# Patient Record
Sex: MALE | Race: BLACK OR AFRICAN AMERICAN | Employment: UNEMPLOYED | URBAN - METROPOLITAN AREA
[De-identification: names, ages, dates, MRNs, and addresses within clinical notes are randomized per-mention and may not be internally consistent; named-entity substitution may affect disease eponyms.]

---

## 2019-05-13 PROBLEM — M54.9 BILATERAL BACK PAIN: Status: ACTIVE | Noted: 2019-05-13

## 2019-05-13 PROBLEM — I10 ESSENTIAL HYPERTENSION: Status: ACTIVE | Noted: 2019-05-13

## 2019-05-13 PROBLEM — E78.5 DYSLIPIDEMIA: Status: ACTIVE | Noted: 2019-05-13

## 2019-05-13 PROBLEM — E11.9 TYPE 2 DIABETES MELLITUS WITHOUT COMPLICATION, WITHOUT LONG-TERM CURRENT USE OF INSULIN (HCC): Status: ACTIVE | Noted: 2019-05-13

## 2020-02-07 PROBLEM — G56.03 CARPAL TUNNEL SYNDROME ON BOTH SIDES: Status: ACTIVE | Noted: 2020-02-07

## 2020-02-07 PROBLEM — G89.21 CHRONIC PAIN DUE TO TRAUMA: Status: ACTIVE | Noted: 2020-02-07

## 2020-05-08 PROBLEM — R74.8 ELEVATED CK: Status: ACTIVE | Noted: 2020-05-08

## 2021-06-09 PROBLEM — E55.9 VITAMIN D DEFICIENCY: Status: ACTIVE | Noted: 2021-06-09

## 2021-11-12 PROBLEM — F52.21 ERECTILE DISORDER, GENERALIZED, MILD: Status: ACTIVE | Noted: 2021-11-12

## 2022-03-18 PROBLEM — F52.21 ERECTILE DISORDER, GENERALIZED, MILD: Status: ACTIVE | Noted: 2021-11-12

## 2022-03-18 PROBLEM — R74.8 ELEVATED CK: Status: ACTIVE | Noted: 2020-05-08

## 2022-03-18 PROBLEM — E55.9 VITAMIN D DEFICIENCY: Status: ACTIVE | Noted: 2021-06-09

## 2022-03-19 PROBLEM — G56.03 CARPAL TUNNEL SYNDROME ON BOTH SIDES: Status: ACTIVE | Noted: 2020-02-07

## 2022-03-19 PROBLEM — E11.9 TYPE 2 DIABETES MELLITUS WITHOUT COMPLICATION, WITHOUT LONG-TERM CURRENT USE OF INSULIN (HCC): Status: ACTIVE | Noted: 2019-05-13

## 2022-03-19 PROBLEM — I10 ESSENTIAL HYPERTENSION: Status: ACTIVE | Noted: 2019-05-13

## 2022-03-19 PROBLEM — M54.9 BILATERAL BACK PAIN: Status: ACTIVE | Noted: 2019-05-13

## 2022-03-19 PROBLEM — E78.5 DYSLIPIDEMIA: Status: ACTIVE | Noted: 2019-05-13

## 2022-03-19 PROBLEM — G89.21 CHRONIC PAIN DUE TO TRAUMA: Status: ACTIVE | Noted: 2020-02-07

## 2022-04-14 ENCOUNTER — HOSPITAL ENCOUNTER (OUTPATIENT)
Dept: GENERAL RADIOLOGY | Age: 57
Discharge: HOME | End: 2022-04-14
Payer: COMMERCIAL

## 2022-04-14 DIAGNOSIS — G89.29 CHRONIC LEFT SHOULDER PAIN: ICD-10-CM

## 2022-04-14 DIAGNOSIS — M25.512 CHRONIC LEFT SHOULDER PAIN: ICD-10-CM

## 2022-04-14 PROCEDURE — 73030 X-RAY EXAM OF SHOULDER: CPT

## 2022-05-31 ENCOUNTER — TELEPHONE (OUTPATIENT)
Dept: FAMILY MEDICINE CLINIC | Facility: CLINIC | Age: 57
End: 2022-05-31

## 2022-05-31 NOTE — TELEPHONE ENCOUNTER
[facility] Nurse- Peer to Peer     Please call back [phone]    Note:appointment  Tomorrow

## 2022-06-06 ENCOUNTER — TELEPHONE (OUTPATIENT)
Dept: FAMILY MEDICINE CLINIC | Facility: CLINIC | Age: 57
End: 2022-06-06

## 2022-06-06 NOTE — TELEPHONE ENCOUNTER
Nurse care manage with first choice -    MRI denied 05/31Reason:    [facility] - Not medical Necessarily     Notes to prove why is it necessarily- labs / ov  Prior to request     Fax# [phone]    Tracking #[id number]     Call ref # [id number]      First choice RN Laura Hassan) [phone]

## 2022-06-06 NOTE — TELEPHONE ENCOUNTER
Per [facility] ([facility] ) MRI was order by Pain management Not NP. Mara Thomas  Left a Voicemail for [place](  RN)

## 2022-06-17 ENCOUNTER — TELEPHONE (OUTPATIENT)
Dept: ENDOCRINOLOGY | Age: 57
End: 2022-06-17

## 2022-06-17 DIAGNOSIS — E11.65 UNCONTROLLED TYPE 2 DIABETES MELLITUS WITH HYPERGLYCEMIA (HCC): Primary | ICD-10-CM

## 2022-06-17 NOTE — TELEPHONE ENCOUNTER
Called patient to let him know about lab orders. Patient asked about medication regimen. Patient states that he is taking his Alogliptin 25 mg in the morning with his Metformin 750 mg. Patient verbalized that he is checking blood sugars before each meal and giving insulin per scale. Patient states numbers have been between 100-136 at this time. Patient also reminded to bring meter to appointment.

## 2022-06-17 NOTE — TELEPHONE ENCOUNTER
Patient's pharmacy called and stated that patient had a medication error. Patient was given Meloxicam 7.5 MG by mistake instead of his Metformin 750mg. Patient took this medication for about three weeks before realizing there was an issue. Juan Carlos Pedraza stated that patient came to them concerned because his sugar were not being controlled. Patient looked at his prescription bottle and realized that he had been taking the wrong mediation. Pharmacist stated that they made sure patient had the correct medication. Called patient to see how he's been feeling. Patient stated that he was feeling better at this time. Patient did state that he had some issues with mental clarity, mostly due to blood sugars being elevated. Patient advised that provider wants to do lab work to check his kidney function. Patient verbalized understanding and will get this done when he's able to obtain transportation.

## 2022-06-17 NOTE — TELEPHONE ENCOUNTER
BMP for \"other\" lab ordered. Please arrange to send lab req to patient or to lab. Please remind patient that with his A1c > 10, the metfomrin alone will not be enough to improve his glucose. It is critical that he check his glucose and use the insulin that is prescribed. Please ensure he is taking and tolerating the nesina that was RX'd. Please ensure he knows that it is critical that he checks his sugar before breakfast/lunch/supper/bed and takes the correction scale Admelog, then rehecks the sugar to verify that his scale is right for him. He should have his meter at our next visit to download and discuss.

## 2022-06-21 ENCOUNTER — TELEPHONE (OUTPATIENT)
Dept: FAMILY MEDICINE CLINIC | Facility: CLINIC | Age: 57
End: 2022-06-21

## 2022-06-21 DIAGNOSIS — M25.512 CHRONIC LEFT SHOULDER PAIN: Primary | ICD-10-CM

## 2022-06-21 DIAGNOSIS — G89.29 CHRONIC LEFT SHOULDER PAIN: Primary | ICD-10-CM

## 2022-06-22 NOTE — TELEPHONE ENCOUNTER
Called pt and he is stating that he hasn't seen Pain Mgmt yet for back due to insurance not covering visit, but wanted to know if we can resend the referral to the same pain mgmt in [place] for his shoulder pain.  Ty

## 2022-07-14 DIAGNOSIS — E11.65 UNCONTROLLED TYPE 2 DIABETES MELLITUS WITH HYPERGLYCEMIA (HCC): ICD-10-CM

## 2022-07-14 LAB
ANION GAP SERPL CALC-SCNC: 9 MMOL/L (ref 7–16)
BUN SERPL-MCNC: 15 MG/DL (ref 6–23)
CALCIUM SERPL-MCNC: 9.7 MG/DL (ref 8.3–10.4)
CHLORIDE SERPL-SCNC: 110 MMOL/L (ref 98–107)
CO2 SERPL-SCNC: 21 MMOL/L (ref 21–32)
CREAT SERPL-MCNC: 1.1 MG/DL (ref 0.8–1.5)
GLUCOSE SERPL-MCNC: 127 MG/DL (ref 65–100)
POTASSIUM SERPL-SCNC: 4.6 MMOL/L (ref 3.5–5.1)
SODIUM SERPL-SCNC: 140 MMOL/L (ref 136–145)

## 2022-09-21 DIAGNOSIS — E55.9 VITAMIN D DEFICIENCY, UNSPECIFIED: ICD-10-CM

## 2022-09-22 RX ORDER — ERGOCALCIFEROL 1.25 MG/1
CAPSULE ORAL
Qty: 4 CAPSULE | Refills: 8 | Status: SHIPPED | OUTPATIENT
Start: 2022-09-22

## 2022-10-06 ENCOUNTER — OFFICE VISIT (OUTPATIENT)
Dept: ENDOCRINOLOGY | Age: 57
End: 2022-10-06
Payer: MEDICAID

## 2022-10-06 VITALS
BODY MASS INDEX: 26.47 KG/M2 | HEART RATE: 83 BPM | SYSTOLIC BLOOD PRESSURE: 140 MMHG | DIASTOLIC BLOOD PRESSURE: 80 MMHG | OXYGEN SATURATION: 97 % | WEIGHT: 169 LBS

## 2022-10-06 DIAGNOSIS — E78.5 DYSLIPIDEMIA: ICD-10-CM

## 2022-10-06 DIAGNOSIS — E11.65 UNCONTROLLED TYPE 2 DIABETES MELLITUS WITH HYPERGLYCEMIA (HCC): Primary | ICD-10-CM

## 2022-10-06 DIAGNOSIS — B35.3 TINEA PEDIS OF BOTH FEET: ICD-10-CM

## 2022-10-06 DIAGNOSIS — I10 ESSENTIAL HYPERTENSION: ICD-10-CM

## 2022-10-06 LAB — HBA1C MFR BLD: 7.2 %

## 2022-10-06 PROCEDURE — 99214 OFFICE O/P EST MOD 30 MIN: CPT | Performed by: PHYSICIAN ASSISTANT

## 2022-10-06 PROCEDURE — 83036 HEMOGLOBIN GLYCOSYLATED A1C: CPT | Performed by: PHYSICIAN ASSISTANT

## 2022-10-06 PROCEDURE — 3046F HEMOGLOBIN A1C LEVEL >9.0%: CPT | Performed by: PHYSICIAN ASSISTANT

## 2022-10-06 RX ORDER — ALOGLIPTIN 25 MG/1
25 TABLET, FILM COATED ORAL DAILY
Qty: 90 TABLET | Refills: 3 | Status: SHIPPED | OUTPATIENT
Start: 2022-10-06

## 2022-10-06 RX ORDER — BLOOD SUGAR DIAGNOSTIC
STRIP MISCELLANEOUS
Qty: 150 EACH | Refills: 11 | Status: SHIPPED | OUTPATIENT
Start: 2022-10-06

## 2022-10-06 RX ORDER — GABAPENTIN 100 MG/1
CAPSULE ORAL 3 TIMES DAILY
COMMUNITY

## 2022-10-06 RX ORDER — LISINOPRIL 20 MG/1
20 TABLET ORAL DAILY
Qty: 90 TABLET | Refills: 3 | Status: SHIPPED | OUTPATIENT
Start: 2022-10-06

## 2022-10-06 RX ORDER — CLOTRIMAZOLE 1 %
CREAM (GRAM) TOPICAL
Qty: 28 G | Refills: 1 | Status: SHIPPED | OUTPATIENT
Start: 2022-10-06 | End: 2022-10-13

## 2022-10-06 RX ORDER — INSULIN LISPRO 100 U/ML
INJECTION, SOLUTION SUBCUTANEOUS 3 TIMES DAILY
COMMUNITY

## 2022-10-06 NOTE — PROGRESS NOTES
[facility]    Jackie Garcia PA-C  [facility]  [address], [address]  [place], [place]  Phone [phone]  Facsimile [phone]          Sofya Bishop is a 55 y.o. male seen 10/6/2022 for follow up evaluation of type 2 diabetes         Assessment and Plan:    In office COVID-19 PPE worn and precautions taken    1. Uncontrolled type 2 diabetes mellitus with hyperglycemia ([facility] [address]  Patient with type 2 diabetes exhibiting suboptimal but much improved glycemic control on DPP-4, metformin, and correction scale insulin. He reports he is monitoring his blood sugar more often and altering or eliminating hyperglycemic triggers. I certainly believe he would benefit from diabetes education however transportation is an issue. He might be open to attending a program in [address] improved lifestyle, more regular monitoring and good mediation compliance       - AMB POC HEMOGLOBIN A1C  - alogliptin (NESINA) 25 MG TABS tablet; Take 1 tablet by mouth daily  Dispense: 90 tablet; Refill: 3  - lisinopril (PRINIVIL;ZESTRIL) 20 MG tablet; Take 1 tablet by mouth daily  Dispense: 90 tablet; Refill: 3  - ACCU-CHEK GUIDE strip; Use to check glucose 4 times daily E11.65  Dispense: 150 each; Refill: 11  - Comprehensive Metabolic Panel; Future  - CBC with Auto Differential; Future  - Microalbumin / Creatinine Urine Ratio; Future  - Lipid Panel; Future  - Hemoglobin A1C; Future  - TSH with Reflex; Future  - Vitamin D 25 Hydroxy; Future  - External Referral to Ophthalmology  - clotrimazole (LOTRIMIN AF) 1 % cream; Apply topically 2 times daily. Dispense: 28 g; Refill: 1    2. Essential hypertension  BP Readings from Last 3 Encounters:   10/06/22 (!) 140/80   05/06/22 (!) 140/92   03/30/22 125/80       - Microalbumin / Creatinine Urine Ratio; Future    3.  Dyslipidemia  Repeat fasting labs prior to next visit  - Lipid Panel; Future    4. Tinea pedis of both feet  RX lotrimin, discussed home foot care          Demetrius Carson was seen today for diabetes. Diagnoses and all orders for this visit:    Uncontrolled type 2 diabetes mellitus with hyperglycemia ([facility] [address]  -     AMB POC HEMOGLOBIN A1C  -     alogliptin (NESINA) 25 MG TABS tablet; Take 1 tablet by mouth daily  -     lisinopril (PRINIVIL;ZESTRIL) 20 MG tablet; Take 1 tablet by mouth daily  -     ACCU-CHEK GUIDE strip; Use to check glucose 4 times daily E11.65  -     Comprehensive Metabolic Panel; Future  -     CBC with Auto Differential; Future  -     Microalbumin / Creatinine Urine Ratio; Future  -     Lipid Panel; Future  -     Hemoglobin A1C; Future  -     TSH with Reflex; Future  -     Vitamin D 25 Hydroxy; Future  -     External Referral to Ophthalmology  -     clotrimazole (LOTRIMIN AF) 1 % cream; Apply topically 2 times daily. Essential hypertension  -     Microalbumin / Creatinine Urine Ratio; Future    Dyslipidemia  -     Lipid Panel; Future    Tinea pedis of both feet          History of Present Illness:    10/6/2022    Pt RTC. Doing well. Taking aloglipitin and metformin without SE. Using correction AC/HS as needed  Wife now going to dialysis center so pt has less stress as her caregiver allowing more time for self care. Is monitoring glucose more regularly and limiting hyperglycemic triggers        Current Regimen: metformin ER 750mg BID, alogliptin 25mg , admelog by correction 1/50>150       5/6/2022   Patient returns clinic for follow-up of worsening and uncontrolled type 2 diabetes. Although he remains compliant with metformin he has tolerated the alogliptin trial but has been out for greater than 1 week. He continues to have access to Admelog but  he is rarely checking his blood sugar, rarely using the Admelog and never rechecking his glucose to see if his current correction scale of 1 per 50 greater than 150 is impactful.   Patient continues to spend most of each visit discussing his pain and stress. His difficulty focusing on his reason for being seen by this office constantly discussing other medical problems. Patient did not bring blood glucometer to the office visit today. 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 Has suffered from several recent family deaths            6/9/2021   VIRTUAL VISIT   Connor Hardy is a 48 y.o. male who was seen by synchronous (real-time) audio-video technology on 6/9/2021 . The patient provided consent for this audio-video interaction. The EverCloud platform was used. Patient and provider were  located at their individual homes. Pt stopped taking basaglar due to stable blood sugars several months ago, not using admelog as prescribed. Pt reports significant stress with car issues and family. Admits to not checking blood sugars. Was treated in ER for hyperglycemia               DIABETES MELLITUS   Connor Hardy is here for  follow up evaluation and treatment of improving type 2 diabetes mellitus. Review of Records: pt with poor glycemic control referred for management            Date of diagnosis: ~2015   Found by wife with sleepiness, called rescue        Started on insulin in hospital    Discharged on metformin   Added long acting around [id number]       Pt has 2 brothers with type 1 diabetes       Denies  HX pancreatitis, DKA, gastroparesis        05/12/2020   Connor Hardy is a 49 y.o. male who was seen by synchronous (real-time) audio-video technology on 05/12/2020 . The patient provided consent for this audio-video interaction. The EverCloud platform was used. 7/7/2020   VIRTUAL VISIT   Connor Hardy is a 49 y.o. male who was seen by synchronous (real-time) audio-video technology on 7/7/2020 .   The patient provided consent for this audio-video interaction. The [employer] platform was used. Patient and provider were located  at their individual homes. Pt meets for virtual follow up diabetes. Stopped insulin approx 1 month ago. States his sugars were normal and did not need insulin. Is rarely checking blood glucose. 9/9/2020   VIRTUAL VISIT   Obdulio Carcamo is a 49 y.o. male who was seen by synchronous (real-time) audio-video technology on 9/9/2020 . The patient provided consent for this audio-video interaction. The [employer] platform was used. Patient and provider were located  at their individual homes. 12/9/2020   VIRTUAL VISIT   Obdulio Carcamo is a 49 y.o. male who was seen by synchronous (real-time) audio-video technology on 12/9/2020 . The patient provided consent for this audio-video interaction. The [employer] platform was used. Patient was located at  their home and provider in the office. History obtained from patient       Diet: eats home, varied, avoid fried food, drink water, some milk in cereal, beer occasionally, some liquor with juice       Exercise:  High stress as caregiver for wife, chronic pain limits activity        Notes increase in blood glucose with stress           Body weight trend: decreasing steadily, over past few years           Wt Readings from Last 3 Encounters:        05/06/22  168 lb (76.2 kg)     03/30/22  168 lb 9.6 oz (76.5 kg)        02/23/22  164 lb (74.4 kg)                                            Wt Readings from Last 3 Encounters:        02/07/20  170 lb 12.8 oz (77.5 kg)     02/06/20  173 lb (78.5 kg)     11/19/19  173 lb (78.5 kg)                 Diabetes education: The patient has received formal diabetes education. ~2018       Diabetic complications:                    Retinopathy: Last eye exam was 5/2020 and demonstrated no diabetic retinopathy. [facility]  care specialist is [facility].                    Albuminuria/nephropathy: 05/13/2019      Cr 1.05, GFR 84, microalbumin/Cr ratio 19.3                           09/13/2019      Cr 1.03, GFR 96                           02/06/2020      Cr 1.07, GFR 91, microalbumin/Cr ratio 5                           05/26/2020      Cr 1.16, GFR 83                               05/07/2021      Cr 1.37, GFR 67                                   08/11/2021      Cr 1.01, GFR 97                                   01/25/2022      Cr 1.14, GFR 72                                  03/30/2022      Cr 1.03, GFR 86, microalbumin/Cr ratio <30     07/14/2022 Cr 1.10, GFR >60                                                   Neuropathy:  numbness and stiffness, swelling, sharp constant bilateral hands and LE  to thigh and to elbow            Home blood glucose monitoring frequency: 0.3 times per day    By review of meter download over past 30 days  Average blood glucose 169 ± 28  Range 143-237     Typical Standard Deviation   Fasting 160 15   AC lunch 176 35   AC supper 160 0   Bedtime - -     Blood glucose levels are MUCH improved!         Hypoglycemia: rare, hypoglycemic aware <80, as low as 57 Symptoms include can't move       Hyperglycemia: Frequent, >200 symptoms headaches       Hemoglobin A1c:               02/12/2019      13.6%               04/18/2019      7.9%               09/13/2019      7.9%               02/06/2020      9.3%               07/14/2020      7.4%                   12/28/2020      8.1%                   05/07/2021      12.5%                       08/11/2021      7.2%                       11/12/2021      8.2%                      03/30/2022      10.1%    10/06/2022 7.2%                                      Other pertinent labs:                   Diabetes labs:                           02/06/2020                                       C-peptide        1.1 (fasting and concomitant )                                       IA-2 Ab            <7.5                                       ZNT8 Ab <1.5                                       AMANDA-65           <5.0                               Lipids:                            09/13/2019  TC- 177, LDL- 96, VLDL- 31,  HDL- 50, TG- 157                           02/06/2020  TC- 153, LDL- 77, VLDL- 29,  HDL- 47, TG- 145                           05/26/2020  TC- 235, LDL- 154, VLDL- 27,  HDL- 54, TG- 134                                      03/30/2022  TC- 194, LDL- 106, VLDL- 32,  HDL- 56, TG- 186             Allergies & Medications:  Reviewed in chart. Review of Systems   Musculoskeletal:  Positive for arthralgias. Skin:         foot pain, worse at plantar MTP     Vital Signs:  BP (!) 140/80 (Site: Left Upper Arm, Position: Sitting)   Pulse 83   Wt 169 lb (76.7 kg)   SpO2 97%   BMI 26.47 kg/m²       Physical Exam  Constitutional:       Appearance: Normal appearance. Neck:      Thyroid: No thyromegaly. Cardiovascular:      Rate and Rhythm: Normal rate and regular rhythm. Pulmonary:      Effort: Pulmonary effort is normal.      Breath sounds: Normal breath sounds. Abdominal:      Palpations: Abdomen is soft. Feet:      Right foot:      Protective Sensation: 3 sites tested. 3 sites sensed. Left foot:      Protective Sensation: 3 sites tested. 3 sites sensed. Comments: Macerated and cracked skin of plantar MTP bilaterally, R>L  Lymphadenopathy:      Cervical: No cervical adenopathy. Skin:     General: Skin is warm and dry. Neurological:      General: No focal deficit present. Mental Status: He is alert. Psychiatric:         Mood and Affect: Mood normal.         Behavior: Behavior normal.         Thought Content: Thought content normal.         Judgment: Judgment normal.           Return 3-4 mo, for Diabetes DM2 Follow-Up. Portions of this note were generated with the assistance of voice recogniton software. As such, some errors in transcription may be present.

## 2022-10-06 NOTE — PATIENT INSTRUCTIONS
Patient Education        Diabetes Foot Health: Care Instructions  Your Care Instructions     When you have diabetes, your feet need extra care and attention. Diabetes can damage the nerve endings and blood vessels in your feet, making you less likely to notice when your feet are injured. Diabetes also limits your body's ability to fight infection and get blood to areas that need it. If you get a minor foot injury, it could become an ulcer or a serious infection. With good foot care,you can prevent most of these problems. Caring for your feet can be quick and easy. Most of the care can be done whenyou are bathing or getting ready for bed. Follow-up care is a key part of your treatment and safety. Be sure to make and go to all appointments, and call your doctor if you are having problems. It's also a good idea to know your test results and keep alist of the medicines you take. How can you care for yourself at home? Keep your blood sugar close to normal by watching what and how much you eat, monitoring blood sugar, taking medicines if prescribed, and getting regular exercise. Do not smoke. Smoking affects blood flow and can make foot problems worse. If you need help quitting, talk to your doctor about stop-smoking programs and medicines. These can increase your chances of quitting for good. Eat a diet that is low in fats. High fat intake can cause fat to build up in your blood vessels and decrease blood flow. Inspect your feet daily for blisters, cuts, cracks, or sores. If you cannot see well, use a mirror or have someone help you. Take care of your feet:  Wash your feet every day. Use warm (not hot) water. Check the water temperature with your wrists or other part of your body, not your feet. Dry your feet well. Pat them dry. Do not rub the skin on your feet too hard. Dry well between your toes. If the skin on your feet stays moist, bacteria or a fungus can grow, which can lead to infection.   Keep your skin soft. Use moisturizing skin cream to keep the skin on your feet soft and prevent calluses and cracks. But do not put the cream between your toes, and stop using any cream that causes a rash. Clean underneath your toenails carefully. Do not use a sharp object to clean underneath your toenails. Use the blunt end of a nail file or other rounded tool. Trim and file your toenails straight across to prevent ingrown toenails. Use a nail clipper, not scissors. Use an emery board to smooth the edges. Change socks daily. Socks without seams are best, because seams often rub the feet. You can find socks for people with diabetes from specialty catalogs. Look inside your shoes every day for things like gravel or torn linings, which could cause blisters or sores. Buy shoes that fit well:  Look for shoes that have plenty of space around the toes. This helps prevent bunions and blisters. Try on shoes while wearing the kind of socks you will usually wear with the shoes. Avoid plastic shoes. They may rub your feet and cause blisters. Good shoes should be made of materials that are flexible and breathable, such as leather or cloth. Break in new shoes slowly by wearing them for no more than an hour a day for several days. Take extra time to check your feet for red areas, blisters, or other problems after you wear new shoes. Do not go barefoot. Do not wear sandals, and do not wear shoes with very thin soles. Thin soles are easy to puncture. They also do not protect your feet from hot pavement or cold weather. Have your doctor check your feet during each visit. If you have a foot problem, see your doctor. Do not try to treat an early foot problem at home. Home remedies or treatments that you can buy without a prescription (such as corn removers) can be harmful. Always get early treatment for foot problems. A minor irritation can lead to a major problem if not properly cared for early. When should you call for help?    Call your doctor now or seek immediate medical care if:    You have a foot sore, an ulcer or break in the skin that is not healing after 4 days, bleeding corns or calluses, or an ingrown toenail. You have blue or black areas, which can mean bruising or blood flow problems. You have peeling skin or tiny blisters between your toes or cracking or oozing of the skin. You have a fever for more than 24 hours and a foot sore. You have new numbness or tingling in your feet that does not go away after you move your feet or change positions. You have unexplained or unusual swelling of the foot or ankle. Watch closely for changes in your health, and be sure to contact your doctor if:    You cannot do proper foot care. Where can you learn more? Go to https://Treventispechaimeb.import2. org and sign in to your [employer] account. Enter A739 in the Solar[employer] box to learn more about \"Diabetes Foot Health: Care Instructions. \"     If you do not have an account, please click on the \"Sign Up Now\" link. Current as of: July 28, 2021               Content Version: 13.3  © 2006-2022 [employer]wise, Incorporated. Care instructions adapted under license by [address] If you have questions about a medical condition or this instruction, always ask your healthcare professional. [address] any warranty or liability for your use of this information.

## 2022-10-17 ENCOUNTER — TELEPHONE (OUTPATIENT)
Dept: ENDOCRINOLOGY | Age: 57
End: 2022-10-17

## 2022-10-17 NOTE — TELEPHONE ENCOUNTER
Patient's pharmacy states that Alogliptin 25 mg is on back order and they are not sure for how long.

## 2022-10-18 NOTE — TELEPHONE ENCOUNTER
Mark Thakkar PA-C  You Yesterday (1:39 PM)     LR  Please ask pt to call other local pharmacies to see if it is in stock, i'm happy to send it to another pharmacy or he can have the pharmacy pull the script      Message given to patient and he will call us back after finding a different pharmacy.

## 2022-10-31 NOTE — TELEPHONE ENCOUNTER
Patient still has not found a pharmacy at this time. He is still checking around and will call us with a decision.

## 2022-12-29 ENCOUNTER — OFFICE VISIT (OUTPATIENT)
Dept: FAMILY MEDICINE CLINIC | Facility: CLINIC | Age: 57
End: 2022-12-29
Payer: COMMERCIAL

## 2022-12-29 VITALS
DIASTOLIC BLOOD PRESSURE: 92 MMHG | HEART RATE: 86 BPM | TEMPERATURE: 98 F | SYSTOLIC BLOOD PRESSURE: 156 MMHG | HEIGHT: 67 IN | WEIGHT: 173.2 LBS | OXYGEN SATURATION: 96 % | BODY MASS INDEX: 27.18 KG/M2

## 2022-12-29 DIAGNOSIS — M25.512 CHRONIC LEFT SHOULDER PAIN: ICD-10-CM

## 2022-12-29 DIAGNOSIS — G89.29 CHRONIC LEFT SHOULDER PAIN: ICD-10-CM

## 2022-12-29 DIAGNOSIS — E55.9 VITAMIN D DEFICIENCY, UNSPECIFIED: ICD-10-CM

## 2022-12-29 DIAGNOSIS — R30.0 DYSURIA: ICD-10-CM

## 2022-12-29 DIAGNOSIS — R10.2 CHRONIC PELVIC PAIN IN MALE: ICD-10-CM

## 2022-12-29 DIAGNOSIS — F52.21 MALE ERECTILE DISORDER (CODE): Primary | ICD-10-CM

## 2022-12-29 DIAGNOSIS — G89.29 CHRONIC PELVIC PAIN IN MALE: ICD-10-CM

## 2022-12-29 PROCEDURE — 81003 URINALYSIS AUTO W/O SCOPE: CPT | Performed by: PHYSICIAN ASSISTANT

## 2022-12-29 PROCEDURE — 3074F SYST BP LT 130 MM HG: CPT | Performed by: PHYSICIAN ASSISTANT

## 2022-12-29 PROCEDURE — 3078F DIAST BP <80 MM HG: CPT | Performed by: PHYSICIAN ASSISTANT

## 2022-12-29 PROCEDURE — 99214 OFFICE O/P EST MOD 30 MIN: CPT | Performed by: PHYSICIAN ASSISTANT

## 2022-12-29 RX ORDER — ERGOCALCIFEROL 1.25 MG/1
50000 CAPSULE ORAL WEEKLY
Qty: 4 CAPSULE | Refills: 4 | Status: SHIPPED | OUTPATIENT
Start: 2022-12-29

## 2022-12-29 RX ORDER — VARDENAFIL HYDROCHLORIDE 10 MG/1
10 TABLET ORAL PRN
Qty: 30 TABLET | Refills: 2 | Status: SHIPPED | OUTPATIENT
Start: 2022-12-29 | End: 2022-12-30 | Stop reason: SDUPTHER
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
## 2022-12-30 ENCOUNTER — TELEPHONE (OUTPATIENT)
Dept: FAMILY MEDICINE CLINIC | Facility: CLINIC | Age: 57
End: 2022-12-30

## 2022-12-30 DIAGNOSIS — F52.21 MALE ERECTILE DISORDER (CODE): ICD-10-CM

## 2022-12-30 LAB
BILIRUBIN, URINE, POC: NEGATIVE
BLOOD URINE, POC: ABNORMAL
GLUCOSE URINE, POC: 1000
KETONES, URINE, POC: ABNORMAL
LEUKOCYTE ESTERASE, URINE, POC: NEGATIVE
NITRITE, URINE, POC: ABNORMAL
PH, URINE, POC: 5 (ref 4.6–8)
PROTEIN,URINE, POC: NEGATIVE
SPECIFIC GRAVITY, URINE, POC: 1.01 (ref 1–1.03)
URINALYSIS CLARITY, POC: ABNORMAL
URINALYSIS COLOR, POC: YELLOW
UROBILINOGEN, POC: 0.2

## 2022-12-30 RX ORDER — VARDENAFIL HYDROCHLORIDE 10 MG/1
10 TABLET ORAL PRN
Qty: 30 TABLET | Refills: 2 | Status: SHIPPED | OUTPATIENT
Start: 2022-12-30

## 2022-12-30 NOTE — TELEPHONE ENCOUNTER
Pt's scripts went to [facility] and he lives in [place] and they need to go to [facility] [address] in [place]. The pain management Dr. Address is [place] [id number]. Phone [phone].

## 2023-01-02 LAB
BACTERIA SPEC CULT: NORMAL
SERVICE CMNT-IMP: NORMAL

## 2023-01-03 NOTE — TELEPHONE ENCOUNTER
I called [facility] and left a message on Dr Luke Daniels regarding transferring [facility] in [facility].

## 2023-01-12 ENCOUNTER — OFFICE VISIT (OUTPATIENT)
Dept: ENDOCRINOLOGY | Age: 58
End: 2023-01-12
Payer: COMMERCIAL

## 2023-01-12 VITALS
OXYGEN SATURATION: 96 % | SYSTOLIC BLOOD PRESSURE: 144 MMHG | DIASTOLIC BLOOD PRESSURE: 88 MMHG | WEIGHT: 172 LBS | HEIGHT: 67 IN | HEART RATE: 86 BPM | BODY MASS INDEX: 27 KG/M2

## 2023-01-12 DIAGNOSIS — I10 ESSENTIAL HYPERTENSION: ICD-10-CM

## 2023-01-12 DIAGNOSIS — E11.65 UNCONTROLLED TYPE 2 DIABETES MELLITUS WITH HYPERGLYCEMIA (HCC): ICD-10-CM

## 2023-01-12 DIAGNOSIS — E11.65 UNCONTROLLED TYPE 2 DIABETES MELLITUS WITH HYPERGLYCEMIA (HCC): Primary | ICD-10-CM

## 2023-01-12 DIAGNOSIS — E78.5 DYSLIPIDEMIA: ICD-10-CM

## 2023-01-12 LAB
25(OH)D3 SERPL-MCNC: 87.1 NG/ML (ref 30–100)
ALBUMIN SERPL-MCNC: 4 G/DL (ref 3.5–5)
ALBUMIN/GLOB SERPL: 1.1 (ref 0.4–1.6)
ALP SERPL-CCNC: 77 U/L (ref 50–136)
ALT SERPL-CCNC: 23 U/L (ref 12–65)
ANION GAP SERPL CALC-SCNC: 8 MMOL/L (ref 2–11)
AST SERPL-CCNC: 18 U/L (ref 15–37)
BASOPHILS # BLD: 0.1 K/UL (ref 0–0.2)
BASOPHILS NFR BLD: 1 % (ref 0–2)
BILIRUB SERPL-MCNC: 0.4 MG/DL (ref 0.2–1.1)
BUN SERPL-MCNC: 10 MG/DL (ref 6–23)
CALCIUM SERPL-MCNC: 9.9 MG/DL (ref 8.3–10.4)
CHLORIDE SERPL-SCNC: 108 MMOL/L (ref 101–110)
CHOLEST SERPL-MCNC: 159 MG/DL
CO2 SERPL-SCNC: 22 MMOL/L (ref 21–32)
CREAT SERPL-MCNC: 1.3 MG/DL (ref 0.8–1.5)
DIFFERENTIAL METHOD BLD: NORMAL
EOSINOPHIL # BLD: 0.1 K/UL (ref 0–0.8)
EOSINOPHIL NFR BLD: 2 % (ref 0.5–7.8)
ERYTHROCYTE [DISTWIDTH] IN BLOOD BY AUTOMATED COUNT: 12 % (ref 11.9–14.6)
GLOBULIN SER CALC-MCNC: 3.5 G/DL (ref 2.8–4.5)
GLUCOSE SERPL-MCNC: 243 MG/DL (ref 65–100)
HCT VFR BLD AUTO: 42.2 % (ref 41.1–50.3)
HDLC SERPL-MCNC: 53 MG/DL (ref 40–60)
HDLC SERPL: 3
HGB BLD-MCNC: 14.3 G/DL (ref 13.6–17.2)
IMM GRANULOCYTES # BLD AUTO: 0 K/UL (ref 0–0.5)
IMM GRANULOCYTES NFR BLD AUTO: 0 % (ref 0–5)
LDLC SERPL CALC-MCNC: 74.4 MG/DL
LYMPHOCYTES # BLD: 2.6 K/UL (ref 0.5–4.6)
LYMPHOCYTES NFR BLD: 28 % (ref 13–44)
MCH RBC QN AUTO: 31.8 PG (ref 26.1–32.9)
MCHC RBC AUTO-ENTMCNC: 33.9 G/DL (ref 31.4–35)
MCV RBC AUTO: 93.8 FL (ref 82–102)
MONOCYTES # BLD: 0.5 K/UL (ref 0.1–1.3)
MONOCYTES NFR BLD: 5 % (ref 4–12)
NEUTS SEG # BLD: 5.9 K/UL (ref 1.7–8.2)
NEUTS SEG NFR BLD: 64 % (ref 43–78)
NRBC # BLD: 0 K/UL (ref 0–0.2)
PLATELET # BLD AUTO: 323 K/UL (ref 150–450)
PMV BLD AUTO: 10.9 FL (ref 9.4–12.3)
POTASSIUM SERPL-SCNC: 4.7 MMOL/L (ref 3.5–5.1)
PROT SERPL-MCNC: 7.5 G/DL (ref 6.3–8.2)
RBC # BLD AUTO: 4.5 M/UL (ref 4.23–5.6)
SODIUM SERPL-SCNC: 138 MMOL/L (ref 133–143)
TRIGL SERPL-MCNC: 158 MG/DL (ref 35–150)
TSH W FREE THYROID IF ABNORMAL: 1.13 UIU/ML (ref 0.36–3.74)
VLDLC SERPL CALC-MCNC: 31.6 MG/DL (ref 6–23)
WBC # BLD AUTO: 9.1 K/UL (ref 4.3–11.1)

## 2023-01-12 PROCEDURE — 3077F SYST BP >= 140 MM HG: CPT | Performed by: PHYSICIAN ASSISTANT

## 2023-01-12 PROCEDURE — 3079F DIAST BP 80-89 MM HG: CPT | Performed by: PHYSICIAN ASSISTANT

## 2023-01-12 PROCEDURE — 99214 OFFICE O/P EST MOD 30 MIN: CPT | Performed by: PHYSICIAN ASSISTANT

## 2023-01-12 RX ORDER — ALOGLIPTIN 25 MG/1
25 TABLET, FILM COATED ORAL DAILY
Qty: 90 TABLET | Refills: 3 | Status: SHIPPED | OUTPATIENT
Start: 2023-01-12

## 2023-01-12 RX ORDER — CLOTRIMAZOLE 1 %
CREAM (GRAM) TOPICAL
COMMUNITY
Start: 2022-12-19

## 2023-01-12 RX ORDER — ERTUGLIFLOZIN 5 MG/1
5 TABLET, FILM COATED ORAL DAILY
Qty: 90 TABLET | Refills: 3 | Status: SHIPPED | OUTPATIENT
Start: 2023-01-12

## 2023-01-12 NOTE — PROGRESS NOTES
[facility] ENDOCRINOLOGY   AND   THYROID NODULE CLINIC    Fady Morrison PA-C  [facility] Endocrinology and Thyroid Nodule [facility]  [address], [address]  [place], [place]  Phone [phone]  Facsimile [phone]          Sarah oRque is a 65 y.o. male seen 1/12/2023 for follow up evaluation of type 2 diabetes        Assessment and Plan:    In office COVID-19 PPE worn and precautions taken    1. Uncontrolled type 2 diabetes mellitus with hyperglycemia ([facility] [address]  Patient with uncontrolled diabetes now exhibiting worsening glycemic control. Although DPP-4 therapy has been impactful in the past he has had intermittent access to it due to national backorder. Patient now has an A1c greater than 9. I have encouraged him to check his blood sugar regularly at the 4 before so that he may benefit from correction. He now has access to his alogliptin and I have reordered it as a 90-day supply to see if we can help ensure he has medication. I will refer to case management to discuss both food insecurities and medication insecurities in this patient who lacks transportation lives in [place] (I believe)    We will start patient on steglatro 5 mg. Most recent renal function is stable. We will repeat BMP at future visit. Patient denies history of diabetic ketoacidosis. We discussed increased risk of urinary tract and genital mycotic infections as well as basic hygiene that may help to prevent infection. At today's visit we discussed sequela associated with uncontrolled diabetes including increased risk of stroke, heart attack, kidney failure, amputation, retinopathy, neuropathy, and nephropathy. Patient was strongly encouraged to comply with treatment regimen as well as dietary and exercise recommendations to aid in control of this chronic disease to help prevent complications associated with uncontrolled diabetes.     Update labs today    - STEGLATRO 5 MG TABS; Take 5 mg by mouth daily Dispense: 90 tablet; Refill: 3  - [facility] - [facility]  - alogliptin (NESINA) 25 MG TABS tablet; Take 1 tablet by mouth daily  Dispense: 90 tablet; Refill: 3    2. Essential hypertension  Low sodium diet encouraged, food insecurities may be impacting this  BP Readings from Last 3 Encounters:   01/12/23 (!) 144/88   12/29/22 (!) 156/92   10/06/22 (!) 140/80         3. Dyslipidemia  Appears to be tolerating statin without any worsening of his chronic pain, last LDL drastically improved and at goal on rosuvastatin 10 mg          Brenna Clark was seen today for diabetes. Diagnoses and all orders for this visit:    Uncontrolled type 2 diabetes mellitus with hyperglycemia (HCC)  -     STEGLATRO 5 MG TABS; Take 5 mg by mouth daily  -     [facility]  -     alogliptin (NESINA) 25 MG TABS tablet; Take 1 tablet by mouth daily  -      DIABETES FOOT EXAM    Essential hypertension    Dyslipidemia          History of Present Illness:        1/12/2023   Interim diabetes HPI:    Unable to obtain alogliptin greater than one month secondary to backorder    Interim medical history changes:   Persisting chronic pain limiting activity    Lifestyle Update:  Constant stress including food and medication insecurities     Current Regimen:  metformin ER 750mg BID, alogliptin 25mg , admelog by correction 1/50>150    Glucose data:    Home blood glucose monitoring frequency: <1 times per day    By recall     Typical Standard Deviation   Fasting 130-160    AC lunch     AC supper ~180    Bedtime High 100-low 200  as high as 280     Blood glucose levels are uncontrolled, most significant elevations are at bedtime    Failed past therapies:     Relevant co morbidities:    Denies  HX pancreatitis, DKA, gastroparesis, foot ulcer    Optho:  Last eye exam was Nov/2022 and demonstrated no diabetic retinopathy. [facility]  care specialist [facility].        Obesity: Body mass index is 26.94 kg/m².        stable      Wt Readings from Last 3 Encounters:   01/12/23 172 lb (78 kg)   12/29/22 173 lb 3.2 oz (78.6 kg)   10/06/22 169 lb (76.7 kg)         CardioVascular:    None     Renal:    Under care of nephro? no    On ARB/ACE-I  lisinopril - 20 MG   05/13/2019      Cr 1.05, GFR 84, microalbumin/Cr ratio 19.3                           09/13/2019      Cr 1.03, GFR 96                           02/06/2020      Cr 1.07, GFR 91, microalbumin/Cr ratio 5                           05/26/2020      Cr 1.16, GFR 83                               05/07/2021      Cr 1.37, GFR 67                                   08/11/2021      Cr 1.01, GFR 97                                   01/25/2022      Cr 1.14, GFR 72                                  03/30/2022      Cr 1.03, GFR 86, microalbumin/Cr ratio <30                                  07/14/2022      Cr 1.10, GFR >60        Lipids:     Current therapy rosuvastatin - 10 MG with good compliance   09/13/2019  TC- 177, LDL- 96, VLDL- 31,  HDL- 50, TG- 157                           02/06/2020  TC- 153, LDL- 77, VLDL- 29,  HDL- 47, TG- 145                           05/26/2020  TC- 235, LDL- 154, VLDL- 27,  HDL- 54, TG- 134                                      03/30/2022  TC- 194, LDL- 106, VLDL- 32,  HDL- 56, TG- 186       03/30/2022  TC- 194, LDL- 27, VLDL- 27,  HDL- 56, TG- 186        Lab Results   Component Value Date    CHOL 194 03/30/2022    CHOL 241 (H) 11/12/2021    CHOL 285 (H) 05/07/2021     Lab Results   Component Value Date    LDLCALC 106 (H) 03/30/2022    LDLCALC 138 (H) 11/12/2021    LDLCALC 144 (H) 05/07/2021      Lab Results   Component Value Date    LABVLDL 27 05/26/2020    LABVLDL 29 02/06/2020    LABVLDL 31 09/13/2019    VLDL 32 03/30/2022    VLDL 57 (H) 11/12/2021    VLDL 102 (H) 05/07/2021      Lab Results   Component Value Date    HDL 56 03/30/2022    HDL 46 11/12/2021    HDL 39 (L) 05/07/2021      Lab Results   Component Value Date    HDL 56 03/30/2022    HDL 46 11/12/2021    HDL 39 (L) 05/07/2021      Lab Results   Component Value Date    TRIG 186 (H) 03/30/2022    TRIG 317 (H) 11/12/2021    TRIG 538 (H) 05/07/2021     No results found for: CHOLHDLRATIO      Hemoglobin A1c:               02/12/2019      13.6%               04/18/2019      7.9%               09/13/2019      7.9%               02/06/2020      9.3%               07/14/2020      7.4%                   12/28/2020      8.1%                   05/07/2021      12.5%                       08/11/2021      7.2%                       11/12/2021      8.2%                      03/30/2022      10.1%                      10/06/2022      7.2%    01/12/2023 9.7%                         Hemoglobin A1C, POC   Date Value Ref Range Status   10/06/2022 7.2 % Final   03/30/2022 9.3 % Final   11/12/2021 8.2 % Final        Thyroid:         Lab Results   Component Value Date/Time    TSH 0.837 03/30/2022 12:22 PM    TSH 0.809 01/25/2022 10:43 AM    TSH 1.520 11/12/2021 10:20 AM             Other pertinent labs:                   Diabetes labs:                           02/06/2020                                       C-peptide        1.1 (fasting and concomitant )                                       IA-2 Ab            <7.5                                       ZNT8 Ab          <1.5                                       AMANDA-65           <5.0                          Allergies & Medications:  Reviewed in chart. Review of Systems    Vital Signs:  BP (!) 144/88   Pulse 86   Ht 5' 7\" (1.702 m)   Wt 172 lb (78 kg)   SpO2 96%   BMI 26.94 kg/m²       Physical Exam  Constitutional:       Appearance: Normal appearance. Neck:      Thyroid: No thyromegaly. Cardiovascular:      Rate and Rhythm: Normal rate and regular rhythm. Pulmonary:      Effort: Pulmonary effort is normal.      Breath sounds: Normal breath sounds. Abdominal:      Palpations: Abdomen is soft.    Feet:      Right foot:      Protective Sensation: 3 sites tested. 3 sites sensed. Left foot:      Protective Sensation: 3 sites tested. 3 sites sensed. Comments: Improving cracked skin of plantar MTP bilaterally, R>L  Lymphadenopathy:      Cervical: No cervical adenopathy. Skin:     General: Skin is warm and dry. Neurological:      General: No focal deficit present. Mental Status: He is alert. Psychiatric:         Mood and Affect: Mood normal.         Behavior: Behavior normal.         Thought Content: Thought content normal.         Judgment: Judgment normal.           Return 4-5 mo, for Diabetes DM2 Follow-Up. Portions of this note were generated with the assistance of voice recogniton software. As such, some errors in transcription may be present.

## 2023-01-16 ENCOUNTER — COMMUNITY OUTREACH (OUTPATIENT)
Dept: CASE MANAGEMENT | Age: 58
End: 2023-01-16

## 2023-01-16 NOTE — PROGRESS NOTES
Wellness Outreach team accepted referral from Brittany Traylor, [facility] for assistance with food insecurity and medication insecurity resources. Called Mr. Romano and he said busy driving. He was given [facility] contact info to call back when he is able to. He verbalized his understanding.

## 2023-01-17 ENCOUNTER — TELEPHONE (OUTPATIENT)
Dept: ENDOCRINOLOGY | Age: 58
End: 2023-01-17

## 2023-01-17 NOTE — TELEPHONE ENCOUNTER
----- Message from Dawn Moreno PA-C sent at 1/16/2023  9:56 AM EST -----  Labs show high sugar, improved cholesterol, and high normal vitamin D. Recommend stop weekly vitamin D and consider a once daily 1000 iu D3 to maintain levels.  This was last filled by PCP and I will copy her

## 2023-03-27 ENCOUNTER — OFFICE VISIT (OUTPATIENT)
Dept: UROLOGY | Age: 58
End: 2023-03-27
Payer: COMMERCIAL

## 2023-03-27 DIAGNOSIS — R10.32 ABDOMINAL PAIN, CHRONIC, BILATERAL LOWER QUADRANT: Primary | ICD-10-CM

## 2023-03-27 DIAGNOSIS — R10.31 ABDOMINAL PAIN, CHRONIC, BILATERAL LOWER QUADRANT: Primary | ICD-10-CM

## 2023-03-27 DIAGNOSIS — G89.29 ABDOMINAL PAIN, CHRONIC, BILATERAL LOWER QUADRANT: Primary | ICD-10-CM

## 2023-03-27 LAB
BILIRUBIN, URINE, POC: NEGATIVE
BLOOD URINE, POC: NORMAL
GLUCOSE URINE, POC: 500
KETONES, URINE, POC: NEGATIVE
LEUKOCYTE ESTERASE, URINE, POC: NEGATIVE
NITRITE, URINE, POC: NEGATIVE
PH, URINE, POC: 6 (ref 4.6–8)
PROTEIN,URINE, POC: NEGATIVE
PVR, POC: 58 CC
SPECIFIC GRAVITY, URINE, POC: 1.01 (ref 1–1.03)
URINALYSIS CLARITY, POC: NORMAL
URINALYSIS COLOR, POC: NORMAL
UROBILINOGEN, POC: NORMAL

## 2023-03-27 PROCEDURE — 99203 OFFICE O/P NEW LOW 30 MIN: CPT | Performed by: UROLOGY

## 2023-03-27 PROCEDURE — 51798 US URINE CAPACITY MEASURE: CPT | Performed by: UROLOGY

## 2023-03-27 PROCEDURE — 81003 URINALYSIS AUTO W/O SCOPE: CPT | Performed by: UROLOGY

## 2023-03-27 RX ORDER — LEVETIRACETAM 250 MG/1
TABLET ORAL
COMMUNITY
Start: 2023-02-14

## 2023-03-27 ASSESSMENT — ENCOUNTER SYMPTOMS
BACK PAIN: 1
NAUSEA: 0
INDIGESTION: 0
CONSTIPATION: 0
HEARTBURN: 0
SHORTNESS OF BREATH: 0
BLOOD IN STOOL: 0
COUGH: 1
ABDOMINAL PAIN: 0
SKIN LESIONS: 0
VOMITING: 0
DIARRHEA: 1
EYES NEGATIVE: 1
WHEEZING: 0

## 2023-03-27 NOTE — Clinical Note
March 27, 2023       [address] YOB: 1965   [address] [place] [address], [address] [place] [address] Drive [address] Date of Visit:  3/27/2023       To Whom It May Concern: It is my medical opinion that [facility] {Work release (duty restriction):75398}. If you have any questions or concerns, please don't hesitate to call.     Sincerely,        Katey Dale MD
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
## 2023-07-04 DIAGNOSIS — E78.5 HYPERLIPIDEMIA, UNSPECIFIED: ICD-10-CM

## 2023-07-04 DIAGNOSIS — E11.9 TYPE 2 DIABETES MELLITUS WITHOUT COMPLICATIONS (HCC): ICD-10-CM

## 2023-07-05 RX ORDER — ROSUVASTATIN CALCIUM 10 MG/1
TABLET, COATED ORAL
Qty: 90 TABLET | Refills: 2 | OUTPATIENT
Start: 2023-07-05

## 2023-07-05 RX ORDER — METFORMIN HYDROCHLORIDE 750 MG/1
TABLET, EXTENDED RELEASE ORAL
Qty: 180 TABLET | Refills: 3 | OUTPATIENT
Start: 2023-07-05

## 2023-07-26 DIAGNOSIS — E11.65 UNCONTROLLED TYPE 2 DIABETES MELLITUS WITH HYPERGLYCEMIA (HCC): ICD-10-CM

## 2023-07-26 RX ORDER — METFORMIN HYDROCHLORIDE 750 MG/1
750 TABLET, EXTENDED RELEASE ORAL
Qty: 90 TABLET | Refills: 3 | Status: SHIPPED | OUTPATIENT
Start: 2023-07-26

## 2023-07-26 NOTE — TELEPHONE ENCOUNTER
Pt LVM requesting metformin sent to [facility] [address]    Sent to AN[facility]O while LR is out of office.

## 2023-07-27 RX ORDER — ERTUGLIFLOZIN 5 MG/1
5 TABLET, FILM COATED ORAL DAILY
Qty: 90 TABLET | Refills: 3 | Status: SHIPPED | OUTPATIENT
Start: 2023-07-27

## 2023-07-27 RX ORDER — LISINOPRIL 20 MG/1
20 TABLET ORAL DAILY
Qty: 90 TABLET | Refills: 3 | Status: SHIPPED | OUTPATIENT
Start: 2023-07-27

## 2023-07-27 RX ORDER — INSULIN LISPRO 100 U/ML
INJECTION, SOLUTION SUBCUTANEOUS
Qty: 18 ML | Refills: 3 | Status: SHIPPED | OUTPATIENT
Start: 2023-07-27

## 2023-07-27 NOTE — TELEPHONE ENCOUNTER
Pt Lvm requesting additional refills of:  Steglatro + lisinopril+ admelog (sttated its )  sent to [facility] [address].     Reminded pt of f/u appt: 2023  4:00 PM

## 2023-07-31 RX ORDER — ROSUVASTATIN CALCIUM 10 MG/1
TABLET, COATED ORAL
Qty: 90 TABLET | Refills: 2 | OUTPATIENT
Start: 2023-07-31

## 2023-07-31 RX ORDER — METFORMIN HYDROCHLORIDE 750 MG/1
TABLET, EXTENDED RELEASE ORAL
Qty: 180 TABLET | Refills: 3 | OUTPATIENT
Start: 2023-07-31

## 2023-08-08 RX ORDER — ROSUVASTATIN CALCIUM 10 MG/1
TABLET, COATED ORAL
Qty: 90 TABLET | Refills: 2 | OUTPATIENT
Start: 2023-08-08

## 2023-09-13 ENCOUNTER — OFFICE VISIT (OUTPATIENT)
Dept: ENDOCRINOLOGY | Age: 58
End: 2023-09-13
Payer: COMMERCIAL

## 2023-09-13 VITALS
WEIGHT: 168.2 LBS | DIASTOLIC BLOOD PRESSURE: 78 MMHG | BODY MASS INDEX: 26.34 KG/M2 | HEART RATE: 89 BPM | SYSTOLIC BLOOD PRESSURE: 129 MMHG | OXYGEN SATURATION: 96 %

## 2023-09-13 DIAGNOSIS — I10 ESSENTIAL HYPERTENSION: ICD-10-CM

## 2023-09-13 DIAGNOSIS — E11.65 UNCONTROLLED TYPE 2 DIABETES MELLITUS WITH HYPERGLYCEMIA (HCC): Primary | ICD-10-CM

## 2023-09-13 DIAGNOSIS — E78.5 DYSLIPIDEMIA: ICD-10-CM

## 2023-09-13 LAB — HBA1C MFR BLD: 8.5 %

## 2023-09-13 PROCEDURE — 3074F SYST BP LT 130 MM HG: CPT | Performed by: PHYSICIAN ASSISTANT

## 2023-09-13 PROCEDURE — 3078F DIAST BP <80 MM HG: CPT | Performed by: PHYSICIAN ASSISTANT

## 2023-09-13 PROCEDURE — 99214 OFFICE O/P EST MOD 30 MIN: CPT | Performed by: PHYSICIAN ASSISTANT

## 2023-09-13 PROCEDURE — 83036 HEMOGLOBIN GLYCOSYLATED A1C: CPT | Performed by: PHYSICIAN ASSISTANT

## 2023-09-13 RX ORDER — ROSUVASTATIN CALCIUM 10 MG/1
10 TABLET, COATED ORAL DAILY
Qty: 90 TABLET | Refills: 3 | Status: SHIPPED | OUTPATIENT
Start: 2023-09-13

## 2023-09-13 RX ORDER — ERTUGLIFLOZIN 5 MG/1
5 TABLET, FILM COATED ORAL DAILY
Qty: 90 TABLET | Refills: 3 | Status: SHIPPED | OUTPATIENT
Start: 2023-09-13

## 2023-09-13 RX ORDER — METFORMIN HYDROCHLORIDE 750 MG/1
750 TABLET, EXTENDED RELEASE ORAL
Qty: 90 TABLET | Refills: 3 | Status: SHIPPED | OUTPATIENT
Start: 2023-09-13

## 2023-09-13 RX ORDER — BLOOD SUGAR DIAGNOSTIC
STRIP MISCELLANEOUS
Qty: 150 EACH | Refills: 11 | Status: SHIPPED | OUTPATIENT
Start: 2023-09-13

## 2023-09-13 RX ORDER — ALOGLIPTIN 25 MG/1
25 TABLET, FILM COATED ORAL DAILY
Qty: 90 TABLET | Refills: 3 | Status: SHIPPED | OUTPATIENT
Start: 2023-09-13

## 2023-09-13 RX ORDER — CELECOXIB 100 MG/1
1 CAPSULE ORAL DAILY
COMMUNITY
Start: 2023-08-29

## 2023-09-19 DIAGNOSIS — E11.65 UNCONTROLLED TYPE 2 DIABETES MELLITUS WITH HYPERGLYCEMIA (HCC): ICD-10-CM

## 2023-09-19 RX ORDER — METFORMIN HYDROCHLORIDE 750 MG/1
TABLET, EXTENDED RELEASE ORAL
Qty: 90 TABLET | Refills: 3 | Status: CANCELLED | OUTPATIENT
Start: 2023-09-19

## 2023-09-19 RX ORDER — METFORMIN HYDROCHLORIDE 750 MG/1
750 TABLET, EXTENDED RELEASE ORAL
Qty: 90 TABLET | Refills: 3 | Status: SHIPPED | OUTPATIENT
Start: 2023-09-19

## 2023-09-19 NOTE — TELEPHONE ENCOUNTER
[facility] pharmacy sent a fax asking for clarification on Metformin sig. Rx has two different contradicting instructions.

## 2023-09-23 DIAGNOSIS — E55.9 VITAMIN D DEFICIENCY, UNSPECIFIED: ICD-10-CM

## 2023-09-25 RX ORDER — ERGOCALCIFEROL 1.25 MG/1
50000 CAPSULE ORAL
Qty: 4 CAPSULE | Refills: 8 | Status: SHIPPED | OUTPATIENT
Start: 2023-09-25

## 2024-02-01 ENCOUNTER — APPOINTMENT (OUTPATIENT)
Dept: VASCULAR SURGERY | Facility: CLINIC | Age: 59
End: 2024-02-01

## 2024-03-14 ENCOUNTER — TELEPHONE (OUTPATIENT)
Dept: ENDOCRINOLOGY | Age: 59
End: 2024-03-14

## 2024-03-14 DIAGNOSIS — E11.65 UNCONTROLLED TYPE 2 DIABETES MELLITUS WITH HYPERGLYCEMIA (HCC): ICD-10-CM

## 2024-03-14 DIAGNOSIS — E78.5 DYSLIPIDEMIA: ICD-10-CM

## 2024-03-14 RX ORDER — ROSUVASTATIN CALCIUM 10 MG/1
10 TABLET, COATED ORAL DAILY
Qty: 90 TABLET | Refills: 3 | Status: SHIPPED | OUTPATIENT
Start: 2024-03-14